# Patient Record
Sex: FEMALE | Race: BLACK OR AFRICAN AMERICAN | NOT HISPANIC OR LATINO | Employment: OTHER | URBAN - METROPOLITAN AREA
[De-identification: names, ages, dates, MRNs, and addresses within clinical notes are randomized per-mention and may not be internally consistent; named-entity substitution may affect disease eponyms.]

---

## 2017-01-03 DIAGNOSIS — Z79.890 POSTMENOPAUSAL HRT (HORMONE REPLACEMENT THERAPY): ICD-10-CM

## 2017-01-03 RX ORDER — MEDROXYPROGESTERONE ACETATE 5 MG/1
5 TABLET ORAL DAILY
Qty: 30 TABLET | Refills: 2 | Status: SHIPPED | OUTPATIENT
Start: 2017-01-03

## 2022-11-18 ENCOUNTER — PATIENT MESSAGE (OUTPATIENT)
Dept: RESEARCH | Facility: HOSPITAL | Age: 52
End: 2022-11-18
Payer: MEDICARE

## 2023-10-01 ENCOUNTER — HOSPITAL ENCOUNTER (EMERGENCY)
Facility: OTHER | Age: 53
Discharge: HOME | End: 2023-10-02
Attending: EMERGENCY MEDICINE
Payer: MEDICARE

## 2023-10-01 VITALS
RESPIRATION RATE: 19 BRPM | BODY MASS INDEX: 33.18 KG/M2 | SYSTOLIC BLOOD PRESSURE: 188 MMHG | WEIGHT: 224 LBS | HEART RATE: 107 BPM | OXYGEN SATURATION: 98 % | DIASTOLIC BLOOD PRESSURE: 88 MMHG | HEIGHT: 69 IN | TEMPERATURE: 99 F

## 2023-10-01 DIAGNOSIS — R10.84 GENERALIZED ABDOMINAL CRAMPING: ICD-10-CM

## 2023-10-01 DIAGNOSIS — G89.29 CHRONIC HIP PAIN, BILATERAL: ICD-10-CM

## 2023-10-01 DIAGNOSIS — M25.551 CHRONIC HIP PAIN, BILATERAL: ICD-10-CM

## 2023-10-01 DIAGNOSIS — M25.552 CHRONIC HIP PAIN, BILATERAL: ICD-10-CM

## 2023-10-01 DIAGNOSIS — T88.7XXA MEDICATION SIDE EFFECT: ICD-10-CM

## 2023-10-01 DIAGNOSIS — K90.49 GASTROINTESTINAL INTOLERANCE TO FOODS: ICD-10-CM

## 2023-10-01 DIAGNOSIS — R19.5 ABNORMAL STOOL COLOR: Primary | ICD-10-CM

## 2023-10-01 PROCEDURE — 99281 EMR DPT VST MAYX REQ PHY/QHP: CPT

## 2023-10-02 ENCOUNTER — TELEPHONE (OUTPATIENT)
Dept: ORTHOPEDICS | Facility: CLINIC | Age: 53
End: 2023-10-02
Payer: MEDICARE

## 2023-10-02 NOTE — ED TRIAGE NOTES
"Conchita Daley, an 53 y.o. female presents to the ED with abd cramping, black  stools after taking pepto bismol.       Chief Complaint   Patient presents with    Abdominal Pain     Noted "black" stools tonight at 2000,  experiencing abd cramping for approx 2 weeks, mostly occurs after eating, increased salad intake instead of solid foods - took pepto-bismol yesterday for GI symptoms.      Review of patient's allergies indicates:   Allergen Reactions    Latex, natural rubber Hives    Bananas [banana] Hives    Ultram [tramadol] Nausea Only, Dermatitis and Rash     Past Medical History:   Diagnosis Date    Neuropathy, thoracic (radicular)     Scoliosis of lumbar spine     Scoliosis of thoracic spine        "

## 2023-10-02 NOTE — DISCHARGE INSTRUCTIONS
Your stool color is dark related to Pepto-Bismol use.  Stop using Pepto-Bismol.  Follow-up with gastroenterology for further evaluation, colonoscopy.  Increase fiber in the diet.  Keep a food diary to help determine which specific foods cause abdominal cramping.  Do not take medications which are not prescribed to you.  Pain management referral was placed.  
Statement Selected

## 2023-10-02 NOTE — ED PROVIDER NOTES
"Encounter Date: 10/1/2023    SCRIBE #1 NOTE: I, Nirmala Qiu, am scribing for, and in the presence of,  Mirta Rucker MD. I have scribed the following portions of the note - Other sections scribed: HPI, ROS, PE.       History     Chief Complaint   Patient presents with    Abdominal Pain     Noted "black" stools tonight at 2000,  experiencing abd cramping for approx 2 weeks, mostly occurs after eating, increased salad intake instead of solid foods - took pepto-bismol yesterday for GI symptoms.      53 y.o. female presents to the ED c/o abdominal pain with frequent loose stools for the last month since changing her diet.  She has been trying to lose weight and eating salads and vegetables on most days.  She states that she has no symptoms when she eats salads, and she has been consuming more fiber. She states each time she eats a "hot meal" instead of her salads  she gets abdominal cramping followed by a loose bowel movement.  She reports pain is resolved each time after having a bowel movement.   Yesterday she decided to "coat my stomach" prior to consuming a "hot meal" with a tablet of mundo seltzer and Pepto-Bismol.  She states that she consumed nearly half a bottle of Pepto. She reports having 4 bowel movements which were black in color over the course of today.   Presently, she has no abdominal pain.  She denies any fever, nausea or vomiting, or urinary symptoms.  She is concerned that she could have blood in her stool due to the black color.    No drug of EtOH use. She notes occasionally taking narcotics for her chronic hip pain.   No further complaints.     The history is provided by the patient and medical records. No  was used.     Review of patient's allergies indicates:   Allergen Reactions    Latex, natural rubber Hives    Bananas [banana] Hives    Ultram [tramadol] Nausea Only, Dermatitis and Rash     Past Medical History:   Diagnosis Date    Neuropathy, thoracic (radicular)     " Scoliosis of lumbar spine     Scoliosis of thoracic spine      Past Surgical History:   Procedure Laterality Date    CHOLECYSTECTOMY      ENDOMETRIAL ABLATION      SALPINGOOPHORECTOMY      right    TOTAL HIP ARTHROPLASTY      right     Family History   Problem Relation Age of Onset    Hypertension Mother      Social History     Tobacco Use    Smoking status: Never    Smokeless tobacco: Never   Substance Use Topics    Alcohol use: No    Drug use: No     Review of Systems   Constitutional:  Positive for appetite change (change in diet). Negative for chills and fever.   HENT:  Negative for congestion and sore throat.    Eyes:  Negative for visual disturbance.   Respiratory:  Negative for cough and shortness of breath.    Cardiovascular:  Negative for chest pain and palpitations.   Gastrointestinal:  Positive for abdominal pain (None presently). Negative for constipation, diarrhea and vomiting.        Positive for black color of stool.   Genitourinary:  Negative for decreased urine volume, dysuria and vaginal discharge.   Musculoskeletal:  Negative for joint swelling, neck pain and neck stiffness.   Skin:  Negative for rash and wound.   Neurological:  Negative for weakness, numbness and headaches.   Psychiatric/Behavioral:  Negative for confusion.        Physical Exam     Initial Vitals [10/01/23 2301]   BP Pulse Resp Temp SpO2   (!) 188/88 107 19 98.7 °F (37.1 °C) 98 %      MAP       --         Physical Exam    Nursing note and vitals reviewed.  Constitutional: She appears well-developed and well-nourished. No distress.   HENT:   Head: Normocephalic and atraumatic.   Mouth/Throat: Oropharynx is clear and moist. No oropharyngeal exudate.   Eyes: Conjunctivae and EOM are normal. Pupils are equal, round, and reactive to light.   Neck: Neck supple.   Cardiovascular:  Normal rate and normal heart sounds.           No murmur heard.  Pulmonary/Chest: Breath sounds normal. No respiratory distress. She has no wheezes. She has  no rhonchi. She has no rales.   Abdominal: Abdomen is soft. There is no abdominal tenderness. There is no rebound and no guarding.   Genitourinary: Rectum:      Guaiac result negative.   Guaiac negative stool. : Acceptable.   Genitourinary Comments: Rectal exam: No hemorrhoids noted.  There is scant black colored stool in the rectal vault.  This is Hemoccult negative.     Musculoskeletal:         General: No tenderness or edema.      Cervical back: Neck supple.     Neurological: She is alert and oriented to person, place, and time. She has normal strength. GCS score is 15. GCS eye subscore is 4. GCS verbal subscore is 5. GCS motor subscore is 6.   Skin: Skin is warm and dry. No rash noted.   Psychiatric: She has a normal mood and affect. Thought content normal.         ED Course   Procedures  Labs Reviewed - No data to display       Imaging Results    None          Medications - No data to display  Medical Decision Making  Emergent evaluation a 53-year-old female who presents with complaint of black stool after ingesting Pepto-Bismol.  She also reports chronic abdominal cramping and relieved by bowel movements when she does not eat high-fiber meals.  Vital signs reveal hypertension, afebrile.  Although tachycardia is reported at triage, this has resolved by time of my exam.  On exam she is a soft nontender abdomen.  There is black stool present but it is guaiac negative.  I suspect that stool color has been altered from consuming Pepto-Bismol.  I am not concerned for a GI bleed or an acute abdominal process.  Patient is advised to continue high-fiber diet, follow-up with GI, and stop using Pepto-Bismol.  She is discharged in good condition and advised close follow-up with GI and PCP, strict return precautions.  She also requested referral for pain management for chronic hip pain.            Scribe Attestation:   Scribe #1: I performed the above scribed service and the documentation accurately  describes the services I performed. I attest to the accuracy of the note.                        Clinical Impression:   Final diagnoses:  [R10.84] Generalized abdominal cramping  [K90.49] Gastrointestinal intolerance to foods  [R19.5] Abnormal stool color (Primary)  [T88.7XXA] Medication side effect  [M25.551, M25.552, G89.29] Chronic hip pain, bilateral        ED Disposition Condition    Discharge Stable          ED Prescriptions    None       Follow-up Information       Follow up With Specialties Details Why Contact Info    Catrachito Alva MD Internal Medicine Schedule an appointment as soon as possible for a visit   [address] [facility]  [address]  [facility] [address] [phone]      [facility] - Emergency Dept Emergency Medicine  As needed, If symptoms worsen [address] [facility] [address] [facility] [id number] [phone]          Physician Attestation for Scribe: I, Mirta Rucker, reviewed documentation as scribed in my presence, which is both accurate and complete.         Mirta Rucker MD  10/02/23 0102